# Patient Record
Sex: FEMALE | Race: WHITE | NOT HISPANIC OR LATINO | Employment: OTHER | URBAN - METROPOLITAN AREA
[De-identification: names, ages, dates, MRNs, and addresses within clinical notes are randomized per-mention and may not be internally consistent; named-entity substitution may affect disease eponyms.]

---

## 2018-01-17 ENCOUNTER — HOSPITAL ENCOUNTER (EMERGENCY)
Facility: HOSPITAL | Age: 83
Discharge: HOME | End: 2018-01-18
Attending: EMERGENCY MEDICINE | Admitting: EMERGENCY MEDICINE
Payer: COMMERCIAL

## 2018-01-17 ENCOUNTER — APPOINTMENT (EMERGENCY)
Dept: RADIOLOGY | Facility: HOSPITAL | Age: 83
End: 2018-01-17
Payer: COMMERCIAL

## 2018-01-17 DIAGNOSIS — L03.115 CELLULITIS OF RIGHT ANKLE: ICD-10-CM

## 2018-01-17 DIAGNOSIS — F03.90 DEMENTIA (HCC): Primary | ICD-10-CM

## 2018-01-17 DIAGNOSIS — S93.401A SPRAIN OF RIGHT ANKLE: ICD-10-CM
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
## 2018-01-17 DIAGNOSIS — M25.471 RIGHT ANKLE SWELLING: ICD-10-CM

## 2018-01-17 PROCEDURE — 73610 X-RAY EXAM OF ANKLE: CPT

## 2018-01-17 RX ORDER — ACETAMINOPHEN 325 MG/1
650 TABLET ORAL ONCE
Status: COMPLETED | OUTPATIENT
Start: 2018-01-18 | End: 2018-01-18

## 2018-01-18 ENCOUNTER — APPOINTMENT (EMERGENCY)
Dept: CT IMAGING | Facility: HOSPITAL | Age: 83
End: 2018-01-18
Payer: COMMERCIAL

## 2018-01-18 VITALS
RESPIRATION RATE: 20 BRPM | SYSTOLIC BLOOD PRESSURE: 143 MMHG | HEART RATE: 86 BPM | WEIGHT: 158.73 LBS | OXYGEN SATURATION: 100 % | DIASTOLIC BLOOD PRESSURE: 65 MMHG | TEMPERATURE: 98.5 F

## 2018-01-18 LAB
ALBUMIN SERPL BCP-MCNC: 3.6 G/DL (ref 3.5–5)
ALP SERPL-CCNC: 75 U/L (ref 46–116)
ALT SERPL W P-5'-P-CCNC: 15 U/L (ref 12–78)
ANION GAP SERPL CALCULATED.3IONS-SCNC: 6 MMOL/L (ref 4–13)
APTT PPP: 30 SECONDS (ref 23–35)
AST SERPL W P-5'-P-CCNC: 12 U/L (ref 5–45)
BASOPHILS # BLD AUTO: 0.03 THOUSANDS/ΜL (ref 0–0.1)
BASOPHILS NFR BLD AUTO: 0 % (ref 0–1)
BILIRUB SERPL-MCNC: 0.42 MG/DL (ref 0.2–1)
BUN SERPL-MCNC: 32 MG/DL (ref 5–25)
CALCIUM SERPL-MCNC: 10.5 MG/DL (ref 8.3–10.1)
CHLORIDE SERPL-SCNC: 106 MMOL/L (ref 100–108)
CO2 SERPL-SCNC: 28 MMOL/L (ref 21–32)
CREAT SERPL-MCNC: 1.68 MG/DL (ref 0.6–1.3)
EOSINOPHIL # BLD AUTO: 0.02 THOUSAND/ΜL (ref 0–0.61)
EOSINOPHIL NFR BLD AUTO: 0 % (ref 0–6)
ERYTHROCYTE [DISTWIDTH] IN BLOOD BY AUTOMATED COUNT: 13.6 % (ref 11.6–15.1)
GFR SERPL CREATININE-BSD FRML MDRD: 26 ML/MIN/1.73SQ M
GLUCOSE SERPL-MCNC: 117 MG/DL (ref 65–140)
HCT VFR BLD AUTO: 35.8 % (ref 34.8–46.1)
HGB BLD-MCNC: 11.8 G/DL (ref 11.5–15.4)
INR PPP: 1.1 (ref 0.86–1.16)
LYMPHOCYTES # BLD AUTO: 1.15 THOUSANDS/ΜL (ref 0.6–4.47)
LYMPHOCYTES NFR BLD AUTO: 11 % (ref 14–44)
MCH RBC QN AUTO: 31.4 PG (ref 26.8–34.3)
MCHC RBC AUTO-ENTMCNC: 33 G/DL (ref 31.4–37.4)
MCV RBC AUTO: 95 FL (ref 82–98)
MONOCYTES # BLD AUTO: 0.98 THOUSAND/ΜL (ref 0.17–1.22)
MONOCYTES NFR BLD AUTO: 10 % (ref 4–12)
NEUTROPHILS # BLD AUTO: 8.12 THOUSANDS/ΜL (ref 1.85–7.62)
NEUTS SEG NFR BLD AUTO: 79 % (ref 43–75)
NRBC BLD AUTO-RTO: 0 /100 WBCS
PLATELET # BLD AUTO: 195 THOUSANDS/UL (ref 149–390)
PMV BLD AUTO: 10.6 FL (ref 8.9–12.7)
POTASSIUM SERPL-SCNC: 5 MMOL/L (ref 3.5–5.3)
PROT SERPL-MCNC: 6.9 G/DL (ref 6.4–8.2)
PROTHROMBIN TIME: 14.2 SECONDS (ref 12.1–14.4)
RBC # BLD AUTO: 3.76 MILLION/UL (ref 3.81–5.12)
SODIUM SERPL-SCNC: 140 MMOL/L (ref 136–145)
WBC # BLD AUTO: 10.3 THOUSAND/UL (ref 4.31–10.16)

## 2018-01-18 PROCEDURE — 80053 COMPREHEN METABOLIC PANEL: CPT | Performed by: EMERGENCY MEDICINE

## 2018-01-18 PROCEDURE — 85025 COMPLETE CBC W/AUTO DIFF WBC: CPT | Performed by: EMERGENCY MEDICINE

## 2018-01-18 PROCEDURE — 85730 THROMBOPLASTIN TIME PARTIAL: CPT | Performed by: EMERGENCY MEDICINE

## 2018-01-18 PROCEDURE — 70450 CT HEAD/BRAIN W/O DYE: CPT

## 2018-01-18 PROCEDURE — 85610 PROTHROMBIN TIME: CPT | Performed by: EMERGENCY MEDICINE

## 2018-01-18 PROCEDURE — 87040 BLOOD CULTURE FOR BACTERIA: CPT | Performed by: EMERGENCY MEDICINE

## 2018-01-18 PROCEDURE — 99284 EMERGENCY DEPT VISIT MOD MDM: CPT

## 2018-01-18 PROCEDURE — 36415 COLL VENOUS BLD VENIPUNCTURE: CPT | Performed by: EMERGENCY MEDICINE

## 2018-01-18 RX ORDER — TRIAMTERENE AND HYDROCHLOROTHIAZIDE 37.5; 25 MG/1; MG/1
1 CAPSULE ORAL EVERY MORNING
COMMUNITY

## 2018-01-18 RX ORDER — AMLODIPINE BESYLATE 2.5 MG/1
2.5 TABLET ORAL DAILY
COMMUNITY

## 2018-01-18 RX ORDER — CEPHALEXIN 500 MG/1
500 CAPSULE ORAL 3 TIMES DAILY
Qty: 15 CAPSULE | Refills: 0 | Status: SHIPPED | OUTPATIENT
Start: 2018-01-18 | End: 2018-01-23

## 2018-01-18 RX ORDER — ATORVASTATIN CALCIUM 10 MG/1
10 TABLET, FILM COATED ORAL DAILY
COMMUNITY

## 2018-01-18 RX ORDER — LISINOPRIL 20 MG/1
20 TABLET ORAL DAILY
COMMUNITY

## 2018-01-18 RX ORDER — CEPHALEXIN 250 MG/1
500 CAPSULE ORAL ONCE
Status: COMPLETED | OUTPATIENT
Start: 2018-01-18 | End: 2018-01-18

## 2018-01-18 RX ADMIN — CEPHALEXIN 500 MG: 250 CAPSULE ORAL at 01:30

## 2018-01-18 RX ADMIN — ACETAMINOPHEN 650 MG: 325 TABLET, FILM COATED ORAL at 00:25

## 2018-01-18 NOTE — ED NOTES
ACE wrap released and reapplied to check circulation  Circulation intact  Patient states, "I sleep pretty good!" No signs of distress         [address], RN  01/18/18 8933

## 2018-01-18 NOTE — ED NOTES
Call placed to patient's  to discuss discharge instruction and mode of transport home, no answer        [address] [facility] [address], RN  01/18/18 3293

## 2018-01-18 NOTE — ED PROVIDER NOTES
History  Chief Complaint   Patient presents with    Ankle Swelling     Pt brought to ED tonight by EMS  EMS reports  called after patient had slow fall from standing position landing on her bottom  EMS reports  reports that he has noticed swelling in right ankle for past week that is now warm to touch and red  EMS reports pt has hx of dementia  Orientated only to self and this is pts baseline    Fall     89 yo demented female who lives in [facility] with her  and presents with R ankle swelling/redness  EMS reports possible fall  Pt completely demented and unable to tell me anything  History provided by:  Patient   used: No    Leg Pain   Location:  Ankle  Lower extremity injury: possibly injured during slip and fall just PTA - might have been swollen before fall, unsure and pt has dementia  Pain details:     Quality:  Aching    Severity:  Mild  Chronicity:  New  Tetanus status:  Unknown  Associated symptoms: no fever        Prior to Admission Medications   Prescriptions Last Dose Informant Patient Reported? Taking? Cholecalciferol (VITAMIN D PO)   Yes Yes   Sig: Take by mouth   Simethicone (GAS RELIEF PO)   Yes Yes   Sig: Take by mouth   amLODIPine (NORVASC) 2 5 mg tablet   Yes Yes   Sig: Take 2 5 mg by mouth daily   atorvastatin (LIPITOR) 10 mg tablet   Yes Yes   Sig: Take 10 mg by mouth daily   lisinopril (ZESTRIL) 20 mg tablet   Yes Yes   Sig: Take 20 mg by mouth daily   triamterene-hydrochlorothiazide (DYAZIDE) 37 5-25 mg per capsule   Yes Yes   Sig: Take 1 capsule by mouth every morning      Facility-Administered Medications: None       Past Medical History:   Diagnosis Date    Hyperlipidemia     Hypertension        No past surgical history on file  No family history on file  I have reviewed and agree with the history as documented      Social History   Substance Use Topics    Smoking status: Former Smoker    Smokeless tobacco: Never Used    Alcohol use No        Review of Systems   Unable to perform ROS: Dementia   Constitutional: Negative for fever  Respiratory: Negative for shortness of breath  Skin: Positive for rash ( redness over R ankle)  Neurological: Negative for seizures  Psychiatric/Behavioral: Positive for confusion (dementia - currently at baseline per discussion over phone with )  Physical Exam  ED Triage Vitals   Temperature Pulse Respirations Blood Pressure SpO2   01/17/18 2335 01/17/18 2335 01/17/18 2335 01/17/18 2335 01/17/18 2335   99 6 °F (37 6 °C) 91 18 151/67 94 %      Temp Source Heart Rate Source Patient Position - Orthostatic VS BP Location FiO2 (%)   01/17/18 2335 -- 01/18/18 0115 01/18/18 0115 --   Oral  Lying Right arm       Pain Score       01/17/18 2335       No Pain           Orthostatic Vital Signs  Vitals:    01/17/18 2335 01/18/18 0100 01/18/18 0115   BP: 151/67  149/65   Pulse: 91 74 74   Patient Position - Orthostatic VS:   Lying       Physical Exam   Constitutional: She appears well-developed and well-nourished  No distress  HENT:   Head: Normocephalic and atraumatic  Eyes: Pupils are equal, round, and reactive to light  Neck: Normal range of motion  Nontender, no vert stepoff   Cardiovascular: Normal rate and regular rhythm  Pulmonary/Chest: Effort normal and breath sounds normal  No respiratory distress  Abdominal: Soft  There is no tenderness  Musculoskeletal: Normal range of motion  She exhibits tenderness (along lateral aspect R ankle + swelling and mild erythema)  She exhibits no deformity  Neurological: She is alert  Confused, very hard of hearing, slow to respond   Skin: Skin is warm  Rash (redness over R ankle) noted  Psychiatric: She has a normal mood and affect  Nursing note and vitals reviewed        ED Medications  Medications   acetaminophen (TYLENOL) tablet 650 mg (650 mg Oral Given 1/18/18 0025)   cephalexin (KEFLEX) capsule 500 mg (500 mg Oral Given 1/18/18 0130)       Diagnostic Studies  Results Reviewed     Procedure Component Value Units Date/Time    Comprehensive metabolic panel [[id number]]  (Abnormal) Collected:  01/18/18 0026    Lab Status:  Final result Specimen:  Blood from Arm, Left Updated:  01/18/18 0051     Sodium 140 mmol/L      Potassium 5 0 mmol/L      Chloride 106 mmol/L      CO2 28 mmol/L      Anion Gap 6 mmol/L      BUN 32 (H) mg/dL      Creatinine 1 68 (H) mg/dL      Glucose 117 mg/dL      Calcium 10 5 (H) mg/dL      AST 12 U/L      ALT 15 U/L      Alkaline Phosphatase 75 U/L      Total Protein 6 9 g/dL      Albumin 3 6 g/dL      Total Bilirubin 0 42 mg/dL      eGFR 26 ml/min/1 73sq m     Narrative:         National Kidney Disease Education Program recommendations are as follows:  GFR calculation is accurate only with a steady state creatinine  Chronic Kidney disease less than 60 ml/min/1 73 sq  meters  Kidney failure less than 15 ml/min/1 73 sq  meters  [facility] [[id number][facility]  (Normal) Collected:  01/18/18 0026    Lab Status:  Final result Specimen:  Blood from Arm, Left Updated:  01/18/18 0045     Protime 14 2 seconds      INR 1 10    APTT [[id number]]  (Normal) Collected:  01/18/18 0026    Lab Status:  Final result Specimen:  Blood from Arm, Left Updated:  01/18/18 0045     PTT 30 seconds     Narrative:          Therapeutic Heparin Range = 60-90 seconds    CBC and differential [[id number]]  (Abnormal) Collected:  01/18/18 0026    Lab Status:  Final result Specimen:  Blood from Arm, Left Updated:  01/18/18 0034     WBC 10 30 (H) Thousand/uL      RBC 3 76 (L) Million/uL      Hemoglobin 11 8 g/dL      Hematocrit 35 8 %      MCV 95 fL      MCH 31 4 pg      MCHC 33 0 g/dL      RDW 13 6 %      MPV 10 6 fL      Platelets 891 Thousands/uL      nRBC 0 /100 WBCs      Neutrophils Relative 79 (H) %      Lymphocytes Relative 11 (L) %      Monocytes Relative 10 %      Eosinophils Relative 0 %      Basophils Relative 0 %      Neutrophils Absolute 8 12 (H) Thousands/µL      Lymphocytes Absolute 1 15 Thousands/µL      Monocytes Absolute 0 98 Thousand/µL      Eosinophils Absolute 0 02 Thousand/µL      Basophils Absolute 0 03 Thousands/µL     Blood culture #2 [[id number]] Collected:  01/18/18 0026    Lab Status: In process Specimen:  Blood from Arm, Left Updated:  01/18/18 0032    Blood culture #1 [[id number]] Collected:  01/18/18 0028    Lab Status: In process Specimen:  Blood from Arm, Left Updated:  01/18/18 0032                 XR ankle 3+ views RIGHT    (Results Pending)   CT head without contrast    (Results Pending)              Procedures  Procedures       Phone Contacts  ED Phone Contact    ED Course  ED Course as of Jan 18 0150   Wed Jan 17, 2018   2330 Pt seen and examined  81 yo demented female who lives in [facility] with her  and presents with R ankle swelling/redness  EMS reports possible fall  Pt completely demented and unable to tell me anything  Will check labs, blood cx, CT head, xray R ankle and give tylenol  200  witnessed slip and fall ambulating with walker - was mechanical, landed on buttocks, did not hit head   is unsure if ankle was swollen before fall  Has hx of frequent falls - 5 times since [facility] Jan [facility] 2018   0044 Xray reviewed, no fracture noted, + soft tissue swelling noted  0055 CT head - 1  There is diffuse ventricular dilatation, suggesting hydrocephalus  Recommend comparison with  any prior studies to assess for interval change  A ventricular catheter is in place  2  Otherwise, no acute findings visualized  0105 Plan to ace wrap R ankle, start keflex as it is unclear whether mild erythema was there before fall or if this is all part of injury which occurred during fall, attempt to ambulate using walker  0122 Pt ambulated well with walker to hallway and back, will arrange ride back to assisted living facility  [facility]  [facility] Time    Disposition  Final diagnoses:   Dementia   Fall on same level from slipping, tripping or stumbling, initial encounter   Right ankle swelling   Cellulitis of right ankle   Sprain of right ankle     Time reflects when diagnosis was documented in both MDM as applicable and the Disposition within this note     Time User Action Codes Description Comment    1/18/2018  1:21 AM Wake Keen M Add [F03 90] Dementia     1/18/2018  1:21 AM Wake Keen M Add [W01  0XXA] Fall on same level from slipping, tripping or stumbling, initial encounter     1/18/2018  1:21 AM Jim Isaac [facility] Right ankle swelling     1/18/2018  1:21 AM Wake Keen M Add [M73 784] Cellulitis of right ankle     1/18/2018  1:22 AM Wake Keen M Add [S93 401A] Sprain of right ankle       ED Disposition     ED Disposition Condition Comment    Discharge  Sondra Leach discharge to home/self care  Condition at discharge: Good        Follow-up Information     Follow up With Specialties Details Why [address] [facility] [place] [facility] [id number] [phone]        Patient's Medications   Discharge Prescriptions    CEPHALEXIN (KEFLEX) 500 MG CAPSULE    Take 1 capsule by mouth 3 (three) times a day for 5 days       Start Date: 1/18/2018 End Date: 1/23/2018       Order Dose: 500 mg       Quantity: 15 capsule    Refills: 0     No discharge procedures on file      ED Provider  Electronically Signed by           Jm Mcdonough DO  01/18/18 0150

## 2018-01-18 NOTE — DISCHARGE INSTRUCTIONS
Cellulitis   WHAT YOU NEED TO KNOW:   Cellulitis is a skin infection caused by bacteria  Cellulitis may go away on its own or you may need treatment  Your healthcare provider may draw a [place] around the outside edges of your cellulitis  If your cellulitis spreads, your healthcare provider will see it outside of the [place]  DISCHARGE INSTRUCTIONS:   Call 911 if:   · You have sudden trouble breathing or chest pain  Seek care immediately if:   · Your wound gets larger and more painful  · You feel a crackling under your skin when you touch it  · You have purple dots or bumps on your skin, or you see bleeding under your skin  · You have new swelling and pain in your legs  · The red, warm, swollen area gets larger  · You see red streaks coming from the infected area  Contact your healthcare provider if:   · You have a fever  · Your fever or pain does not go away or gets worse  · The area does not get smaller after 2 days of antibiotics  · Your skin is flaking or peeling off  · You have questions or concerns about your condition or care  Medicines:   · Antibiotics  help treat the bacterial infection  · NSAIDs , such as ibuprofen, help decrease swelling, pain, and fever  NSAIDs can cause stomach bleeding or kidney problems in certain people  If you take blood thinner medicine, always ask if NSAIDs are safe for you  Always read the medicine label and follow directions  Do not give these medicines to children under 10months of age without direction from your child's healthcare provider  · Acetaminophen  decreases pain and fever  It is available without a doctor's order  Ask how much to take and how often to take it  Follow directions  Read the labels of all other medicines you are using to see if they also contain acetaminophen, or ask your doctor or pharmacist  Acetaminophen can cause liver damage if not taken correctly   Do not use more than 4 grams (4,000 milligrams) total of acetaminophen in one day  · Take your medicine as directed  Contact your healthcare provider if you think your medicine is not helping or if you have side effects  Tell him or her if you are allergic to any medicine  Keep a list of the medicines, vitamins, and herbs you take  Include the amounts, and when and why you take them  Bring the list or the pill bottles to follow-up visits  Carry your medicine list with you in case of an emergency  Self-care:   · Elevate the area above the level of your heart  as often as you can  This will help decrease swelling and pain  Prop the area on pillows or blankets to keep it elevated comfortably  · Clean the area daily until the wound scabs over  Gently wash the area with soap and water  Pat dry  Use dressings as directed  · Place cool or warm, wet cloths on the area as directed  Use clean cloths and clean water  Leave it on the area until the cloth is room temperature  Pat the area dry with a clean, dry cloth  The cloths may help decrease pain  Prevent cellulitis:   · Do not scratch bug bites or areas of injury  You increase your risk for cellulitis by scratching these areas  · Do not share personal items, such as towels, clothing, and razors  · Clean exercise equipment  with germ-killing  before and after you use it  · Wash your hands often  Use soap and water  Wash your hands after you use the bathroom, change a child's diapers, or sneeze  Wash your hands before you prepare or eat food  Use lotion to prevent dry, cracked skin  · Wear pressure stockings as directed  You may be told to wear the stockings if you have peripheral edema  The stockings improve blood flow and decrease swelling  · Treat athlete's foot  This can help prevent the spread of a bacterial skin infection  Follow up with your healthcare provider within 3 days, or as directed: Your healthcare provider will check if your cellulitis is getting better   You may need different medicine  Write down your questions so you remember to ask them during your visits  © 2017 [address] Information is for End User's use only and may not be sold, redistributed or otherwise used for commercial purposes  All illustrations and images included in CareNotes® are the copyrighted property of [employer]  or [address]  The above information is an  only  It is not intended as medical advice for individual conditions or treatments  Talk to your doctor, nurse or pharmacist before following any medical regimen to see if it is safe and effective for you  Fall Prevention for Older Adults   WHAT YOU NEED TO KNOW:   As you age, your muscles weaken and your risk for falls increases  Your risk also increases if you take medicines that make you sleepy or dizzy  You may also be at risk if you have vision or joint problems, have low blood pressure, or are not active  DISCHARGE INSTRUCTIONS:   Call 911 or have someone else call if:   · You have fallen and are unconscious  · You have fallen and cannot move part of your body  Contact your healthcare provider if:   · You have fallen and have pain or a headache  · You have questions or concerns about your condition or care  Fall prevention tips:   · Stay active  Exercise can help strengthen your muscles and improve your balance  Your healthcare provider may recommend water aerobics, walking, or Uriel Chi  He may also recommend physical therapy to improve your coordination  Never start an exercise program without asking your healthcare provider first     · Wear shoes that fit well and have soles that   Wear shoes both inside and outside  Use slippers with good   Avoid shoes with high heels  · Use assistive devices as directed  Your healthcare provider may suggest that you use a cane or walker to help you keep your balance   You may need to have grab bars put in your bathroom near the toilet or in the shower  · Stand or sit up slowly  This may help you keep your balance and prevent falls  · Wear a personal alarm  This is a device that allows you to call 911 if you need help  Ask for more information on personal alarms  · Manage your medical conditions  Keep all appointments with your healthcare providers  Visit your eye doctor as directed  Home safety tips:   · Add items to prevent falls in the bathroom  Put nonslip strips on your bath or shower floor to prevent you from slipping  Use a bath mat if you do not have carpet in the bathroom  This will prevent you from falling when you step out of the bath or shower  Use a shower seat so you do not need to stand while you shower  Sit on the toilet or a chair in your bathroom to dry yourself and put on clothing  This will prevent you from losing your balance from drying or dressing yourself while you are standing  · Keep paths clear  Remove books, shoes, and other objects from walkways and stairs  Place cords for telephones and lamps out of the way so that you do not need to walk over them  Tape them down if you cannot move them  Remove small rugs  If you cannot remove a rug, secure it with double-sided tape  This will prevent you from tripping  · Install bright lights in your home  Use night lights to help light paths to the bathroom or kitchen  Always turn on the light before you start walking  · Keep items you use often on shelves within reach  Do not use a step stool to help you reach an item  · Paint or place reflective tape on the edges of your stairs  This will help you see the stairs better  Follow up with your healthcare provider as directed:  Write down your questions so you remember to ask them during your visits  © 2017 [address] Information is for End User's use only and may not be sold, redistributed or otherwise used for commercial purposes   All illustrations and images included in [facility] are the copyrighted property of A D A M , Inc  or Yair Donald  The above information is an  only  It is not intended as medical advice for individual conditions or treatments  Talk to your doctor, nurse or pharmacist before following any medical regimen to see if it is safe and effective for you  Ankle Sprain, Ambulatory Care   GENERAL INFORMATION:   An ankle sprain happens when 1 or more ligaments in your ankle joint stretch or tear  It is usually caused by a direct injury or sudden twisting of the joint  Common symptoms include the following:   · Trouble moving your ankle or foot    · Pain when you touch or put weight on your ankle    · Bruised, swollen, or odd shaped ankle  Seek immediate care for the following symptoms:   · Severe pain in your ankle    · Cold or numb foot or toes    · A weaker ankle    · Swelling that has increased or returned  Treatment for an ankle sprain  may include a supportive device, such as a brace, cast, or splint  These devices limit movement and protect your joint  You may also need to use crutches to decrease your pain as you move around  Treatment may also include pain medicine, physical therapy, or surgery if the ligament does not heal   Care for an ankle sprain:   · Rest  your joint so that it can heal  Return to normal activities as directed  · Ice  helps decrease swelling and pain  Ice may also help prevent tissue damage  Use an ice pack or put crushed ice in a plastic bag  Cover the ice pack with a towel and place it on your injured ligament for 15 to 20 minutes every hour  Use the ice for as long as directed  · Compression  of an elastic bandage provides support and helps decrease swelling and movement so your joint can heal  Ask if you should wrap an elastic bandage around your injured ligament  Wear as long as directed  · Elevate  your injured ankle raised above the level of your heart as often as you can   This will help decrease or limit swelling  Elevate your ankle by resting it on pillows  Prevent another ankle sprain:   · Return to your usual activities as directed  If you start activity too soon, you may develop a more serious injury  · Take it slow  Slowly increase how often and how long you exercise  Sudden increases may cause you to overstretch or tear your ligament  · Always warm up  and stretch before you exercise or play sports  · Use the proper equipment  Always wear shoes that fit well and are made for the activity that you are doing  You may need to use ankle supports, elbow and knee pads, or braces  Follow up with your healthcare provider as directed:  Write down your questions so you remember to ask them during your visits  CARE AGREEMENT:   You have the right to help plan your care  Learn about your health condition and how it may be treated  Discuss treatment options with your caregivers to decide what care you want to receive  You always have the right to refuse treatment  The above information is an  only  It is not intended as medical advice for individual conditions or treatments  Talk to your doctor, nurse or pharmacist before following any medical regimen to see if it is safe and effective for you  © 2014 [address] is for End User's use only and may not be sold, redistributed or otherwise used for commercial purposes  All illustrations and images included in CareNotes® are the copyrighted property of A D A M , Inc  or Yair Donald

## 2018-01-18 NOTE — ED NOTES
Spoke with  of patient, reports that he will be in to pickup patient and privately transport patient home at around 1000        [address] [address] [facility] [address], RN  01/18/18 0176

## 2018-01-18 NOTE — ED NOTES
Call placed to [address], no asnwer        [address] [place] [address], RN  01/18/18 1430

## 2018-01-18 NOTE — ED NOTES
Medications, history, allergies obtained from  of patient, Marcelina Panre, at [phone]       [address] [place] [address], RN  01/18/18 6655

## 2018-01-18 NOTE — ED NOTES
Patient transported to [facility], [address] [place] [address]  01/17/18 8835

## 2018-01-18 NOTE — ED NOTES
Pt  incontinent of urine, perineum cleansed with foam cleanser and new attends reapplied       Liv Jeronimo RN  01/18/18 7152

## 2018-01-18 NOTE — ED NOTES
Patient ambulatory with assistance of walker        [address] [facility] [address], RN  01/18/18 2583

## 2018-01-18 NOTE — ED NOTES
Patient's  at the bedside, discharge instructions and RX explained to the        Steve Solis RN  01/18/18 0922

## 2018-01-23 LAB
BACTERIA BLD CULT: NORMAL
BACTERIA BLD CULT: NORMAL